# Patient Record
Sex: MALE | Race: WHITE | NOT HISPANIC OR LATINO | Employment: FULL TIME | URBAN - METROPOLITAN AREA
[De-identification: names, ages, dates, MRNs, and addresses within clinical notes are randomized per-mention and may not be internally consistent; named-entity substitution may affect disease eponyms.]

---

## 2017-09-07 ENCOUNTER — HOSPITAL ENCOUNTER (EMERGENCY)
Facility: MEDICAL CENTER | Age: 22
End: 2017-09-07
Attending: EMERGENCY MEDICINE
Payer: COMMERCIAL

## 2017-09-07 VITALS
TEMPERATURE: 98.7 F | SYSTOLIC BLOOD PRESSURE: 127 MMHG | OXYGEN SATURATION: 97 % | HEIGHT: 71 IN | WEIGHT: 299.38 LBS | BODY MASS INDEX: 41.91 KG/M2 | HEART RATE: 95 BPM | DIASTOLIC BLOOD PRESSURE: 75 MMHG | RESPIRATION RATE: 18 BRPM

## 2017-09-07 DIAGNOSIS — T50.901A OVERDOSE, ACCIDENTAL OR UNINTENTIONAL, INITIAL ENCOUNTER: ICD-10-CM

## 2017-09-07 DIAGNOSIS — F41.9 ANXIETY: ICD-10-CM

## 2017-09-07 LAB
ALBUMIN SERPL BCP-MCNC: 4.8 G/DL (ref 3.2–4.9)
ALBUMIN/GLOB SERPL: 1.5 G/DL
ALP SERPL-CCNC: 77 U/L (ref 30–99)
ALT SERPL-CCNC: 32 U/L (ref 2–50)
ANION GAP SERPL CALC-SCNC: 10 MMOL/L (ref 0–11.9)
APAP SERPL-MCNC: <10 UG/ML (ref 10–30)
AST SERPL-CCNC: 21 U/L (ref 12–45)
BILIRUB SERPL-MCNC: 0.7 MG/DL (ref 0.1–1.5)
BUN SERPL-MCNC: 13 MG/DL (ref 8–22)
CALCIUM SERPL-MCNC: 10.2 MG/DL (ref 8.5–10.5)
CHLORIDE SERPL-SCNC: 103 MMOL/L (ref 96–112)
CO2 SERPL-SCNC: 24 MMOL/L (ref 20–33)
CREAT SERPL-MCNC: 1.06 MG/DL (ref 0.5–1.4)
EKG IMPRESSION: NORMAL
GFR SERPL CREATININE-BSD FRML MDRD: >60 ML/MIN/1.73 M 2
GLOBULIN SER CALC-MCNC: 3.1 G/DL (ref 1.9–3.5)
GLUCOSE SERPL-MCNC: 105 MG/DL (ref 65–99)
POTASSIUM SERPL-SCNC: 3.3 MMOL/L (ref 3.6–5.5)
PROT SERPL-MCNC: 7.9 G/DL (ref 6–8.2)
SALICYLATES SERPL-MCNC: 0 MG/DL (ref 15–25)
SODIUM SERPL-SCNC: 137 MMOL/L (ref 135–145)

## 2017-09-07 PROCEDURE — 93005 ELECTROCARDIOGRAM TRACING: CPT

## 2017-09-07 PROCEDURE — 99284 EMERGENCY DEPT VISIT MOD MDM: CPT

## 2017-09-07 PROCEDURE — 93005 ELECTROCARDIOGRAM TRACING: CPT | Performed by: EMERGENCY MEDICINE

## 2017-09-07 PROCEDURE — 36415 COLL VENOUS BLD VENIPUNCTURE: CPT

## 2017-09-07 PROCEDURE — 80053 COMPREHEN METABOLIC PANEL: CPT

## 2017-09-07 PROCEDURE — 80307 DRUG TEST PRSMV CHEM ANLYZR: CPT

## 2017-09-07 RX ORDER — METHYLPHENIDATE HYDROCHLORIDE 36 MG/1
72 TABLET ORAL EVERY MORNING
COMMUNITY

## 2017-09-07 ASSESSMENT — LIFESTYLE VARIABLES: DO YOU DRINK ALCOHOL: NO

## 2017-09-07 ASSESSMENT — ENCOUNTER SYMPTOMS
NERVOUS/ANXIOUS: 1
PALPITATIONS: 1

## 2017-09-07 ASSESSMENT — PAIN SCALES - GENERAL
PAINLEVEL_OUTOF10: 1

## 2017-09-07 NOTE — ED NOTES
Pt ambulated to triage with   Chief Complaint   Patient presents with   • Drug Overdose     possibly took ADHD medication twice, Concerta, pt believes he took a dose at 745 am and 846 am this morning.  pt reports he has been having confusion, shaking hands, dry mouth, sweating, HA, stomach pain, and racing heart.      Pt A/O but feels confused.  Pt reports taking 2 tablets of concerta (ritalin/methelphenidare) twice, 36 mg each, total 4 tablets of each 36mg.  Called for EKG.  Pt Informed regarding triage process and verbalized understanding to inform triage tech or RN for any changes in condition. Placed in lobby.

## 2017-09-07 NOTE — ED PROVIDER NOTES
ED Provider Note    Scribed for Guillermo Williamson M.D. by Suleman Allen. 9/7/2017, 1:48 PM.    Means of arrival: Walk in  History obtained from: Patient  History limited by: None    CHIEF COMPLAINT  Chief Complaint   Patient presents with   • Drug Overdose     possibly took ADHD medication twice, Concerta, pt believes he took a dose at 745 am and 846 am this morning.  pt reports he has been having confusion, shaking hands, dry mouth, sweating, HA, stomach pain, and racing heart.      HPI  Marcial Horton is a 22 y.o. male who presents to the Emergency Department complaining of possible medication overdose, onset prior to arrival in the ED. The patient notes that he took a dose of Concerta at 7:45 and 8:45 AM. He noticed he took too many medications when he became agitated and inpatient. Patient notes that he then developed palpitations, mouth dryness, sweating, abdominal discomfort, and lightheadedness. The symptoms have improved but not resolved.  He has not had this before.  Denies any other medical problems or ingestion    REVIEW OF SYSTEMS  Review of Systems   Cardiovascular: Positive for palpitations.   Psychiatric/Behavioral: The patient is nervous/anxious.    All other systems reviewed and are negative.  C.    PAST MEDICAL HISTORY   has a past medical history of Asthma.    SURGICAL HISTORY  patient denies any surgical history    SOCIAL HISTORY  Social History   Substance Use Topics   • Smoking status: Current Some Day Smoker   • Smokeless tobacco: Never Used   • Alcohol use Yes      Comment: occ      History   Drug Use No     FAMILY HISTORY  History reviewed. No pertinent family history.    CURRENT MEDICATIONS  Home Medications     Reviewed by Angelica Tejeda (Pharmacy Tech) on 09/07/17 at 1502  Med List Status: Complete   Medication Last Dose Status   methylphenidate (CONCERTA) 36 MG CR tablet 9/7/2017 Active              ALLERGIES  Allergies   Allergen Reactions   • Sulfa Drugs Hives     PHYSICAL  "EXAM  VITAL SIGNS: BP (!) 165/88   Pulse (!) 113   Temp 37.1 °C (98.7 °F)   Resp 18   Ht 1.803 m (5' 11\")   Wt (!) 135.8 kg (299 lb 6.2 oz)   SpO2 97%   BMI 41.76 kg/m²   Vitals reviewed.  Constitutional: Awake, alert, anxious, no acute distress  HENT: Normocephalic, Atraumatic, Bilateral external ears normal, Oropharynx moist, No oral exudates, Nose normal.   Eyes: PERRL, EOMI, Conjunctiva normal, No discharge.   Neck: Normal range of motion, No tenderness, Supple, No stridor.   Cardiovascular: Normal heart rate, Normal rhythm, No murmurs, No rubs, No gallops.   Thorax & Lungs: Normal breath sounds, No respiratory distress, No wheezing, No chest tenderness.   Abdomen: Bowel sounds normal, Soft, No tenderness  Skin: Warm, Dry, No erythema, No rash.   Back: No tenderness, No CVA tenderness.   Musculoskeletal: Good range of motion in all major joints.  Neurologic: Alert, Normal motor function, Normal sensory function, No focal deficits noted.   Psychiatric: Affect anxious    LABS  Results for orders placed or performed during the hospital encounter of 09/07/17   COMP METABOLIC PANEL   Result Value Ref Range    Sodium 137 135 - 145 mmol/L    Potassium 3.3 (L) 3.6 - 5.5 mmol/L    Chloride 103 96 - 112 mmol/L    Co2 24 20 - 33 mmol/L    Anion Gap 10.0 0.0 - 11.9    Glucose 105 (H) 65 - 99 mg/dL    Bun 13 8 - 22 mg/dL    Creatinine 1.06 0.50 - 1.40 mg/dL    Calcium 10.2 8.5 - 10.5 mg/dL    AST(SGOT) 21 12 - 45 U/L    ALT(SGPT) 32 2 - 50 U/L    Alkaline Phosphatase 77 30 - 99 U/L    Total Bilirubin 0.7 0.1 - 1.5 mg/dL    Albumin 4.8 3.2 - 4.9 g/dL    Total Protein 7.9 6.0 - 8.2 g/dL    Globulin 3.1 1.9 - 3.5 g/dL    A-G Ratio 1.5 g/dL   ACETAMINOPHEN   Result Value Ref Range    Acetaminophen -Tylenol <10 10 - 30 ug/mL   SALICYLATE   Result Value Ref Range    Salicylates, Quant. 0 (L) 15 - 25 mg/dL   ESTIMATED GFR   Result Value Ref Range    GFR If African American >60 >60 mL/min/1.73 m 2    GFR If Non  " American >60 >60 mL/min/1.73 m 2   EKG (ER)   Result Value Ref Range    Report       [facility] Emergency Dept.    Test Date:  2017  Pt Name:    BRAD KELLY                 Department: ER  MRN:        [id number]                      Room:       [facility]  Gender:     M                            Technician: [id number]  :        1995                   Requested By:ER TRIAGE PROTOCOL  Order #:    [id number]                    Reading MD:    Measurements  Intervals                                Axis  Rate:       98                           P:          53  IA:         152                          QRS:        94  QRSD:       98                           T:          4  QT:         340  QTc:        435    Interpretive Statements  SINUS RHYTHM  BORDERLINE RIGHT AXIS DEVIATION  No previous ECG available for comparison       All labs reviewed by me.    Interpreted by me    Rhythm:  Normal sinus rhythm   Rate: 98  Axis: normal  Ectopy: none  Conduction: normal  ST Segments: no acute change  T Waves: no acute change  Q Waves: none  Clinical Impression: Normal EKG without acute changes     COURSE & MEDICAL DECISION MAKING  Pertinent Labs & Imaging studies reviewed. (See chart for details)    1:48 PM Patient seen and examined at bedside. The patient presents with Agitation and anxiety and tachycardia after taking too much of his medications, and the differential diagnosis includes but is not limited to overdose, anxiety attack, or other.. Ordered for CMP, Acetaminophen, Salicylate, and EKG to evaluate.     4:20 PM - Recheck with the patient. I discussed with him the laboratory and radiology results which patient feels completely better.  Feels back to baseline and would like to go home.  Denies being suicidal.  This is an accidental overdose.  He was cleared by poison control.  He is a mildly low potassium, otherwise labs are normal.  He does not require further workup.    The patient will return for new  or worsening symptoms and is stable at the time of discharge.    The patient is referred to a primary physician for blood pressure management, diabetic screening, and for all other preventative health concerns.    DISPOSITION:  Patient will be discharged home in stable condition.    FOLLOW UP:  Your Physician  Varies    Schedule an appointment as soon as possible for a visit in 2 days      FINAL IMPRESSION  1. Overdose, accidental or unintentional, initial encounter    2. Anxiety        Suleman BOX (Scribe), am scribing for, and in the presence of, Guillermo Williamson M.D.    Electronically signed by: Suleman Allen (Scribe), 9/7/2017    Guillermo BOX M.D. personally performed the services described in this documentation, as scribed by Suleman Allen in my presence, and it is both accurate and complete.    The note accurately reflects work and decisions made by me.  Guillermo Williamson  9/7/2017  4:26 PM

## 2017-09-07 NOTE — DISCHARGE INSTRUCTIONS
Return to the ER for any new medical problems or complaints.  Follow-up with your doctor      Accidental Overdose  A drug overdose occurs when a chemical substance (drug or medication) is used in amounts large enough to overcome a person. This may result in severe illness or death. This is a type of poisoning. Accidental overdoses of medications or other substances come from a variety of reasons. When this happens accidentally, it is often because the person taking the substance does not know enough about what they have taken. Drugs which commonly cause overdose deaths are alcohol, psychotropic medications (medications which affect the mind), pain medications, illegal drugs (street drugs) such as cocaine and heroin, and multiple drugs taken at the same time. It may result from careless behavior (such as over-indulging at a party). Other causes of overdose may include multiple drug use, a lapse in memory, or drug use after a period of no drug use.   Sometimes overdosing occurs because a person cannot remember if they have taken their medication.   A common unintentional overdose in young children involves multi-vitamins containing iron. Iron is a part of the hemoglobin molecule in blood. It is used to transport oxygen to living cells. When taken in small amounts, iron allows the body to restock hemoglobin. In large amounts, it causes problems in the body. If this overdose is not treated, it can lead to death.  Never take medicines that show signs of tampering or do not seem quite right. Never take medicines in the dark or in poor lighting. Read the label and check each dose of medicine before you take it. When adults are poisoned, it happens most often through carelessness or lack of information. Taking medicines in the dark or taking medicine prescribed for someone else to treat the same type of problem is a dangerous practice.  SYMPTOMS   Symptoms of overdose depend on the medication and amount taken. They can  "vary from over-activity with stimulant over-dosage, to sleepiness from depressants such as alcohol, narcotics and tranquilizers. Confusion, dizziness, nausea and vomiting may be present. If problems are severe enough coma and death may result.  DIAGNOSIS   Diagnosis and management are generally straightforward if the drug is known. Otherwise it is more difficult. At times, certain symptoms and signs exhibited by the patient, or blood tests, can reveal the drug in question.   TREATMENT   In an emergency department, most patients can be treated with supportive measures. Antidotes may be available if there has been an overdose of opioids or benzodiazepines. A rapid improvement will often occur if this is the cause of overdose.  At home or away from medical care:  · There may be no immediate problems or warning signs in children.  · Not everything works well in all cases of poisoning.  · Take immediate action. Poisons may act quickly.  · If you think someone has swallowed medicine or a household product, and the person is unconscious, having seizures (convulsions), or is not breathing, immediately call for an ambulance.  IF a person is conscious and appears to be doing OK but has swallowed a poison:  · Do not wait to see what effect the poison will have. Immediately call a poison control center (listed in the white pages of your telephone book under \"Poison Control\" or inside the front cover with other emergency numbers). Some poison control centers have TTY capability for the deaf. Check with your local center if you or someone in your family requires this service.  · Keep the container so you can read the label on the product for ingredients.  · Describe what, when, and how much was taken and the age and condition of the person poisoned. Inform them if the person is vomiting, choking, drowsy, shows a change in color or temperature of skin, is conscious or unconscious, or is convulsing.  · Do not cause vomiting unless " instructed by medical personnel. Do not induce vomiting or force liquids into a person who is convulsing, unconscious, or very drowsy.  Stay calm and in control.   · Activated charcoal also is sometimes used in certain types of poisoning and you may wish to add a supply to your emergency medicines. It is available without a prescription. Call a poison control center before using this medication.  PREVENTION   Thousands of children die every year from unintentional poisoning. This may be from household chemicals, poisoning from carbon monoxide in a car, taking their parent's medications, or simply taking a few iron pills or vitamins with iron. Poisoning comes from unexpected sources.  · Store medicines out of the sight and reach of children, preferably in a locked cabinet. Do not keep medications in a food cabinet. Always store your medicines in a secure place. Get rid of  medications.  · If you have children living with you or have them as occasional guests, you should have child-resistant caps on your medicine containers. Keep everything out of reach. Child proof your home.  · If you are called to the telephone or to answer the door while you are taking a medicine, take the container with you or put the medicine out of the reach of small children.  · Do not take your medication in front of children. Do not tell your child how good a medication is and how good it is for them. They may get the idea it is more of a treat.  · If you are an adult and have accidentally taken an overdose, you need to consider how this happened and what can be done to prevent it from happening again. If this was from a street drug or alcohol, determine if there is a problem that needs addressing. If you are not sure a problems exists, it is easy to talk to a professional and ask them if they think you have a problem. It is better to handle this problem in this way before it happens again and has a much worse consequence.     This  information is not intended to replace advice given to you by your health care provider. Make sure you discuss any questions you have with your health care provider.     Document Released: 03/03/2006 Document Revised: 01/08/2016 Document Reviewed: 08/09/2010  Elsevier Interactive Patient Education ©2016 [employer]

## 2017-09-07 NOTE — ED NOTES
Pt given discharge instructions/ home care instructions, pt verbalized understanding of instructions given, pt ambulatory to ER [facility]/ pt driven home by roomate.

## 2017-09-07 NOTE — ED NOTES
The Medication Reconciliation process has been completed by interviewing the patient who reportedly took an extra dose of his concerta this morning.     Allergies have been reviewed  Antibiotic use in 30 days - none    Home Pharmacy:  [facility]

## 2017-09-07 NOTE — ED NOTES
Poison control case # [id number] (debbie) reports that pt it clear to d/c home, VSS, pt reports to feel less agitated, no longer shaking.

## 2019-05-30 ENCOUNTER — NON-PROVIDER VISIT (OUTPATIENT)
Dept: OCCUPATIONAL MEDICINE | Facility: CLINIC | Age: 24
End: 2019-05-30

## 2019-05-30 ENCOUNTER — OFFICE VISIT (OUTPATIENT)
Dept: OCCUPATIONAL MEDICINE | Facility: CLINIC | Age: 24
End: 2019-05-30

## 2019-05-30 VITALS
DIASTOLIC BLOOD PRESSURE: 78 MMHG | SYSTOLIC BLOOD PRESSURE: 116 MMHG | OXYGEN SATURATION: 99 % | HEIGHT: 71 IN | BODY MASS INDEX: 40.77 KG/M2 | TEMPERATURE: 97.8 F | WEIGHT: 291.2 LBS | HEART RATE: 84 BPM

## 2019-05-30 DIAGNOSIS — Z02.1 PRE-EMPLOYMENT HEALTH SCREENING EXAMINATION: ICD-10-CM

## 2019-05-30 DIAGNOSIS — Z02.1 PRE-EMPLOYMENT DRUG SCREENING: ICD-10-CM

## 2019-05-30 LAB
AMP AMPHETAMINE: NORMAL
BAR BARBITURATES: NORMAL
BZO BENZODIAZEPINES: NORMAL
COC COCAINE: NORMAL
INT CON NEG: NORMAL
INT CON POS: NORMAL
MDMA ECSTASY: NORMAL
MET METHAMPHETAMINES: NORMAL
MTD METHADONE: NORMAL
OPI OPIATES: NORMAL
OXY OXYCODONE: NORMAL
PCP PHENCYCLIDINE: NORMAL
POC URINE DRUG SCREEN OCDRS: NORMAL
THC: NORMAL

## 2019-05-30 PROCEDURE — 8915 PR COMPREHENSIVE PHYSICAL: Performed by: PHYSICIAN ASSISTANT

## 2019-05-30 PROCEDURE — 92553 AUDIOMETRY AIR & BONE: CPT | Performed by: PHYSICIAN ASSISTANT

## 2019-05-30 PROCEDURE — 80305 DRUG TEST PRSMV DIR OPT OBS: CPT | Performed by: PHYSICIAN ASSISTANT

## 2019-07-14 ENCOUNTER — HOSPITAL ENCOUNTER (EMERGENCY)
Facility: MEDICAL CENTER | Age: 24
End: 2019-07-14
Attending: EMERGENCY MEDICINE
Payer: COMMERCIAL

## 2019-07-14 VITALS
RESPIRATION RATE: 16 BRPM | WEIGHT: 290 LBS | SYSTOLIC BLOOD PRESSURE: 104 MMHG | DIASTOLIC BLOOD PRESSURE: 64 MMHG | OXYGEN SATURATION: 95 % | TEMPERATURE: 97.9 F | BODY MASS INDEX: 40.6 KG/M2 | HEART RATE: 109 BPM | HEIGHT: 71 IN

## 2019-07-14 DIAGNOSIS — F10.920 ALCOHOLIC INTOXICATION WITHOUT COMPLICATION (HCC): ICD-10-CM

## 2019-07-14 DIAGNOSIS — R45.851 SUICIDAL IDEATION: ICD-10-CM

## 2019-07-14 LAB
AMPHET UR QL SCN: NEGATIVE
BARBITURATES UR QL SCN: NEGATIVE
BENZODIAZ UR QL SCN: NEGATIVE
BZE UR QL SCN: NEGATIVE
CANNABINOIDS UR QL SCN: NEGATIVE
METHADONE UR QL SCN: NEGATIVE
OPIATES UR QL SCN: NEGATIVE
OXYCODONE UR QL SCN: NEGATIVE
PCP UR QL SCN: NEGATIVE
POC BREATHALIZER: 0.07 PERCENT (ref 0–0.01)
POC BREATHALIZER: 0.12 PERCENT (ref 0–0.01)
PROPOXYPH UR QL SCN: NEGATIVE

## 2019-07-14 PROCEDURE — 90791 PSYCH DIAGNOSTIC EVALUATION: CPT

## 2019-07-14 PROCEDURE — 99285 EMERGENCY DEPT VISIT HI MDM: CPT

## 2019-07-14 PROCEDURE — 302970 POC BREATHALIZER: Performed by: EMERGENCY MEDICINE

## 2019-07-14 PROCEDURE — 80307 DRUG TEST PRSMV CHEM ANLYZR: CPT

## 2019-07-14 RX ORDER — LORAZEPAM 2 MG/ML
2 INJECTION INTRAMUSCULAR ONCE
Status: DISCONTINUED | OUTPATIENT
Start: 2019-07-14 | End: 2019-07-14 | Stop reason: HOSPADM

## 2019-07-14 RX ORDER — DIPHENHYDRAMINE HYDROCHLORIDE 50 MG/ML
25 INJECTION INTRAMUSCULAR; INTRAVENOUS ONCE
Status: DISCONTINUED | OUTPATIENT
Start: 2019-07-14 | End: 2019-07-14 | Stop reason: HOSPADM

## 2019-07-14 RX ORDER — HALOPERIDOL 5 MG/ML
5 INJECTION INTRAMUSCULAR ONCE
Status: DISCONTINUED | OUTPATIENT
Start: 2019-07-14 | End: 2019-07-14 | Stop reason: HOSPADM

## 2019-07-14 NOTE — ED TRIAGE NOTES
"Pt brought in by ambulance and police in hand cuffs on legal 2K. Per police, friends called because when they were walking back from the bar pt was saying how he didn't want to be here anymore, wanted to kill himself, running into the street, friends had to tackle him and pull him out of street. When they got back to apartment, pt then proceeded to grab firearms, unloaded them, put gun to chin and pulling the trigger. Pt denies SI at this time. Pt is loud, yelling, refusing to change into hospital attire, refusing to do breathalyzer. Pt does admit to drinking tonight but states \"that has worn off\". Awaiting provider eval.  "

## 2019-07-14 NOTE — ED PROVIDER NOTES
ER Provider Addendum Note     Scribed for Emiliano Diaz M.D. by Rashawn Richards. 2019, 9:59 AM.    This is an addendum to the note on ivet Horton. For further details and full chart entry, see the previously signed ED Provider Note written by Dr. Nevarez ([facility]).     MEDICAL DECISION MAKIN:15 AM - Patient's case was transferred into my care by. See previously signed note for further details.     10:00 AM Patient was reevaluated at bedside. He is calm at this time without distress and denies SI or HI. He is safe to be discharged at this time and recommended follow up with the resources he was given by psychiatry. The patient will be discharged and should return if symptoms worsen or if new symptoms arise. The patient understands and agrees to plan.      Decision Making:  This is a 24 y.o. year old male who presents with suicidal ideation while intoxicated.  On evaluation here with life skills he appears to be low risk for self-harm and is denying suicidal ideation at this time.  He has been medically cleared.  He denies any suicidal ideation to both me as well as the life skills evaluator.  He will be discharged with instructions to avoid heavy alcohol use and follow-up with his primary doctor.    The patient will return for new or worsening symptoms and is stable at the time of discharge.    The patient is referred to a primary physician for blood pressure management, diabetic screening, and for all other preventative health concerns.    DISPOSITION:  Patient will be discharged home in stable condition.    FOLLOW UP:  No follow-up provider specified.    FINAL IMPRESSION   1. Alcoholic intoxication without complication (HCC)    2. Suicidal ideation         Rashawn BOX (Scribe), roberto scribing for, and in the presence of, Emiliano Diaz M.D..    Electronically signed by: Rashawn Richards (Carolyn), 2019    Emiliano BOX M.D. personally performed the services described in this documentation, as scribed by Rashawn  Susie in my presence, and it is both accurate and complete.    The note accurately reflects work and decisions made by me.  Emiliano Diaz  7/14/2019  10:06 AM

## 2019-07-14 NOTE — ED NOTES
Pt is requesting assistance with getting bed to flatten out a bit. Need assistance from security to remove restraints from bed to allow for adjustments.

## 2019-07-14 NOTE — ED NOTES
Security called at this time. Pt slamming himself and hand cuffs into wall, now there is a hole present. MD called for orders.

## 2019-07-14 NOTE — ED PROVIDER NOTES
"ED Provider Note    Scribed for Steve Nevarez M.D. by Bryan Sheppard. 7/14/2019, 2:44 AM.    Primary care provider: Pcp Pt States None  Means of arrival: Police/EMS  History obtained from: Patient  History limited by: None    CHIEF COMPLAINT  Chief Complaint   Patient presents with   • Legal 2000       HPI  Marcial Horton is a 24 y.o. male who presents to the Emergency Department escorted by police for psychological evaluation for suspected suicidal ideation onset earlier tonight. The police report that the patient had suicidal ideation and was caught unloading his guns, putting an unloaded gun to his head, and jumping into oncoming traffic. Upon evaluation, the patient requests to be let go and is mildly uncooperative. He admits to unloading his firearms and placing an unloaded gun to his head, but denies jumping into oncoming traffic. The patient denies any suicidal ideation, but states that he \"has been feeling a little sad\" since his girlfriend broke up with him. There are no known alleviating factors. He admits to alcohol usage, stating that he had 6 drinks earlier tonight. The patient denies any drug abuse or auditory hallucinations. He notes that he has had one previous episode of self harm via cutting but states \"it was a long time ago\".      REVIEW OF SYSTEMS  Pertinent positives include depression and alcohol abuse. Pertinent negatives include auditory hallucinations. All other systems negative.    PAST MEDICAL HISTORY   has a past medical history of Asthma.    SURGICAL HISTORY  patient denies any surgical history    SOCIAL HISTORY  Social History   Substance Use Topics   • Smoking status: Current Some Day Smoker   • Smokeless tobacco: Never Used   • Alcohol use Yes      Comment: occ      History   Drug Use No       FAMILY HISTORY  No family history on file.    CURRENT MEDICATIONS  Home Medications    **Home medications have not yet been reviewed for this encounter**         ALLERGIES  Allergies " "  Allergen Reactions   • Sulfa Drugs Hives       PHYSICAL EXAM  VITAL SIGNS: /76   Pulse (!) 138   Temp 37.2 °C (99 °F) (Temporal)   Resp 20   Ht 1.803 m (5' 11\")   Wt (!) 131.5 kg (290 lb)   BMI 40.45 kg/m²     Constitutional: Well developed, Well nourished, moderate distress.   HENT: Normocephalic, Atraumatic, Oropharynx moist.   Eyes: Conjunctiva normal, No discharge.   Neck: Supple, No stridor   Cardiovascular: Tachycardic, Normal rhythm, No murmurs, equal pulses.   Pulmonary: Normal breath sounds, No respiratory distress, No wheezing, No rales, No rhonchi.  Chest: No chest wall tenderness or deformity.   Abdomen:Soft, No tenderness, No masses, no rebound, no guarding.   Back: No CVA tenderness.   Musculoskeletal: No major deformities noted, No tenderness.   Skin: Warm, Dry, No erythema, No rash.   Neurologic: Alert & oriented x 3, Normal motor function,  No focal deficits noted.   Psychiatric: Patient states that he is depressed because his girlfriend broke up with him, drinking tonight, wanted to be alone, friends thought he was suicidal and put an unloaded gun to his head.     LABS  Results for orders placed or performed during the hospital encounter of 07/14/19   URINE DRUG SCREEN (TRIAGE)   Result Value Ref Range    Amphetamines Urine Negative Negative    Barbiturates Negative Negative    Benzodiazepines Negative Negative    Cocaine Metabolite Negative Negative    Methadone Negative Negative    Opiates Negative Negative    Oxycodone Negative Negative    Phencyclidine -Pcp Negative Negative    Propoxyphene Negative Negative    Cannabinoid Metab Negative Negative   POC BREATHALIZER   Result Value Ref Range    POC Breathalizer 0.116 (A) 0.00 - 0.01 Percent     All labs reviewed by me.    COURSE & MEDICAL DECISION MAKING  Pertinent Labs & Imaging studies reviewed. (See chart for details)    2:44 AM - Patient seen and examined at bedside. Patient will be treated with Haldol 5 mg, Ativan 2 mg, and " Benadryl 25 mg. Ordered POC breathalyzer, urine drug screen, and blood alcohol  to evaluate his symptoms. The differential diagnoses include but are not limited to: Suicidal ideation, alcohol intoxication        Medical Decision Making: At this point time I think patient has situational depression versus suicidal ideation.  He was significantly intoxicated when all this happened.  Patient will be turned over pending sobriety and evaluation by life skills      FINAL IMPRESSION  1. Alcoholic intoxication without complication (HCC)    2. Suicidal ideation          IBryan (Carolyn), am scribing for, and in the presence of, Steve Nevarez M.D.    Electronically signed by: Bryan Sheppard (Carolyn), 7/14/2019    ISteve M.D. personally performed the services described in this documentation, as scribed by Bryan Sheppard in my presence, and it is both accurate and complete.    C.    The note accurately reflects work and decisions made by me.  Steve Nevarez  7/14/2019  6:22 AM

## 2019-07-14 NOTE — ED NOTES
Pt requesting that he be allowed to go, asks where mental health team is. Wants to go back to work tomorrow.

## 2019-07-14 NOTE — ED NOTES
Pt cooperative at this time, all 4 restraints removed. Pt educated on proper behavior and rules of unit. Pt verbalized understanding of rules and process of unit. Pt changed into hospital gown, all belongings secured with security. Pt ambulatory to bathroom with steady gait, provided urine sample per RN request. Blankets given. TV turned on per request. Pt given water per request. Pt denies further needs. Remains on legal 2K.
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
## 2019-07-14 NOTE — DISCHARGE PLANNING
Alert Team    Alert Team aware of pending behavioral consult; will attempt to evaluate patient once he is legally sober (Breathalizer reading at 0.116 at 0258).

## 2020-10-15 ENCOUNTER — OFFICE VISIT (OUTPATIENT)
Dept: URGENT CARE | Facility: CLINIC | Age: 25
End: 2020-10-15

## 2020-10-15 DIAGNOSIS — Z01.89 RESPIRATORY CLEARANCE EXAMINATION, ENCOUNTER FOR: ICD-10-CM

## 2020-10-15 PROCEDURE — 94010 BREATHING CAPACITY TEST: CPT | Performed by: FAMILY MEDICINE

## 2020-10-15 PROCEDURE — 8916 PR CLEARANCE ONLY: Performed by: FAMILY MEDICINE
